# Patient Record
Sex: FEMALE | Race: OTHER | NOT HISPANIC OR LATINO
[De-identification: names, ages, dates, MRNs, and addresses within clinical notes are randomized per-mention and may not be internally consistent; named-entity substitution may affect disease eponyms.]

---
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
## 2024-08-05 NOTE — ASU PATIENT PROFILE, PEDIATRIC - DATE/TIME OF ACCEPTANCE
"Sherry Sweeney is a 46 year old female who presents for:  Chief Complaint   Patient presents with    Botox Migraine        Initial Vitals:  BP (!) 125/90   Pulse 90   Wt 59.1 kg (130 lb 4.8 oz)   BMI 24.62 kg/m   Estimated body mass index is 24.62 kg/m  as calculated from the following:    Height as of 6/6/24: 1.549 m (5' 1\").    Weight as of this encounter: 59.1 kg (130 lb 4.8 oz).. Body surface area is 1.59 meters squared. BP completed using cuff size: wrist cuff    Gonzalez Rivera  " 13-Sep-2019 12:35

## 2025-04-23 NOTE — H&P PST PEDIATRIC - NEURO
FAMILY HISTORY:  Father  Still living? Yes, Estimated age: Age Unknown  FH: diabetes mellitus, Age at diagnosis: Age Unknown    Mother  Still living? Yes, Estimated age: Age Unknown  FH: thyroid cancer, Age at diagnosis: Age Unknown    
Motor strength normal in all extremities/Affect appropriate/Verbalization clear and understandable for age/Normal unassisted gait/Sensation intact to touch/Interactive